# Patient Record
Sex: MALE | Race: WHITE | HISPANIC OR LATINO | Employment: UNEMPLOYED | URBAN - METROPOLITAN AREA
[De-identification: names, ages, dates, MRNs, and addresses within clinical notes are randomized per-mention and may not be internally consistent; named-entity substitution may affect disease eponyms.]

---

## 2022-05-23 ENCOUNTER — HOSPITAL ENCOUNTER (EMERGENCY)
Facility: CLINIC | Age: 27
Discharge: HOME | End: 2022-05-23
Attending: EMERGENCY MEDICINE | Admitting: EMERGENCY MEDICINE

## 2022-05-23 VITALS
HEIGHT: 67 IN | BODY MASS INDEX: 22.49 KG/M2 | DIASTOLIC BLOOD PRESSURE: 51 MMHG | TEMPERATURE: 98.1 F | HEART RATE: 66 BPM | SYSTOLIC BLOOD PRESSURE: 123 MMHG | OXYGEN SATURATION: 98 % | RESPIRATION RATE: 13 BRPM | WEIGHT: 143.3 LBS

## 2022-05-23 DIAGNOSIS — S61.012A LACERATION OF LEFT THUMB WITHOUT FOREIGN BODY WITHOUT DAMAGE TO NAIL, INITIAL ENCOUNTER: ICD-10-CM

## 2022-05-23 PROCEDURE — 99283 EMERGENCY DEPT VISIT LOW MDM: CPT

## 2022-05-23 PROCEDURE — 12001 RPR S/N/AX/GEN/TRNK 2.5CM/<: CPT

## 2022-05-23 ASSESSMENT — ENCOUNTER SYMPTOMS
NUMBNESS: 0
CONSTITUTIONAL NEGATIVE: 1
WOUND: 1

## 2022-05-23 NOTE — ED TRIAGE NOTES
Pt visiting from [place], was taking the trash out and cut his left thumb with a piece of glass that was on the bottom of the trash bag -about 2 hrs ago. Denies any PMHx. And states he had a tetanus shot 1 yr ago as he had another laceratin back then.     Triage Assessment     Row Name 05/23/22 0625       Triage Assessment (Adult)    Airway WDL WDL       Respiratory WDL    Respiratory WDL WDL       Skin Circulation/Temperature WDL    Skin Circulation/Temperature WDL X   about 3 cm lac to the left thumb       Cardiac WDL    Cardiac WDL WDL       Peripheral/Neurovascular WDL    Peripheral Neurovascular WDL WDL       Cognitive/Neuro/Behavioral WDL    Cognitive/Neuro/Behavioral WDL WDL

## 2022-05-23 NOTE — ED PROVIDER NOTES
"  History     Chief Complaint:  Thumb Laceration       HPI   Fan Smith is a 26 year old male who presents with left thumb laceration.  Patient reports that he was taking out the trash this morning, and he used 2 hands to attempt to throw the bag into a larger receptacle outside.  He states he cut his hand on something inside of the bed, he thinks this was a piece of glass or a bottle inside the bag.  He reports pain in his left thumb with throbbing.  Sensation and motor are intact.  He reports he was drinking alcohol this morning, 4-5 beers.    He reports he is visiting from [place], staying with his cousin.    He notes that his tetanus was updated last year when he had another laceration.    ROS:  Review of Systems   Constitutional: Negative.    Skin: Positive for wound.   Neurological: Negative for numbness.       Allergies:  No Known Allergies     Medications:    Denies current daily medications    Past Medical History:    Denies pertinent past medical history    Past Surgical History:    No past surgical history on file.     Family History:    family history is not on file.    Social History:   Smokes 1 pack of cigarettes per day  Reports daily alcohol use.  Reports marijuana use.  PCP: No primary care provider on file.     Physical Exam     Patient Vitals for the past 24 hrs:   BP Temp Temp src Pulse Resp SpO2 Height Weight   05/23/22 0624 123/51 98.1  F (36.7  C) Temporal 66 13 98 % 1.7 m (5' 6.93\") 65 kg (143 lb 4.8 oz)        Physical Exam  Vitals: Reviewed, as above.   General: Alert and oriented, in mild distress. Resting on bed.  Skin: Warm and well-perfused. 2 cm laceration to left thumb volar surface just proximal to IP joint.   HEENT: Head: Normocephalic, atraumatic. Facial features symmetric. Eyes: Conjunctiva pink, sclera white. EOMs grossly intact. Ears: Auricles without lesion, erythema, or edema. Mouth and throat: Lips are moist with no chapping, lesions, or edema, Buccal mucosa is " pink and moist without lesions. Oropharyngeal mucosa is pink and moist with no erythema, edema, or exudate.   Neck: Supple with no lymphadenopathy. Full ROM.   Pulmonary: Chest wall expansion symmetric with no increased work of breathing. Lungs clear to auscultation bilaterally.   Cardiovascular: Heart RRR with no murmurs, rubs, or gallops. 2+ radial pulses bilaterally. No peripheral edema.   Abdominal: No distension.   Musculoskeletal: Moves all extremities spontaneously. Full ROM and 5/5 strength to left thumb and fingers. No tenderness to palpation of MCPs.   Psych: Affect appropriate.  Answers questions appropriately. Patient appears calm.      Emergency Department Course       Laboratory:  Labs Ordered and Resulted from Time of ED Arrival to Time of ED Departure - No data to display     [facility]    -Laceration Repair    Date/Time: 5/23/2022 8:28 AM  Performed by: Darlene Moctezuma PA-C  Authorized by: Darlene Moctezuma PA-C     Risks, benefits and alternatives discussed.      ANESTHESIA (see MAR for exact dosages):     Anesthesia method:  Local infiltration    Local anesthetic:  Lidocaine 1% w/o epi  LACERATION DETAILS     Location:  Finger    Finger location:  L thumb    Length (cm):  2    Depth (mm):  3    REPAIR TYPE:     Repair type:  Simple      EXPLORATION:     Hemostasis achieved with:  Direct pressure    Wound exploration: wound explored through full range of motion and entire depth of wound probed and visualized      TREATMENT:     Area cleansed with:  Saline and Shur-Clens    Visualized foreign bodies/material removed: no      SKIN REPAIR     Repair method:  Sutures    Suture size:  4-0    Suture material:  Nylon    Suture technique:  Simple interrupted    APPROXIMATION     Approximation:  Close    POST-PROCEDURE DETAILS     Dressing:  Antibiotic ointment and bulky dressing        PROCEDURE    Patient Tolerance:  Patient tolerated the procedure well with no immediate  complications       Emergency Department Course:             Reviewed:  I reviewed nursing notes, vitals and past medical history    Assessments/Consults:   ED Course as of 05/23/22 0906   Mon May 23, 2022   0645 I obtained history and examined the patient, as noted above.     0715 I rechecked the patient and performed laceration repair, as noted above.         Interventions:  Lidocaine 1% 5.5 mL, per procedure note    Disposition:  The patient was discharged to home.      Impression & Plan    CMS Diagnoses: None      Medical Decision Making:  Fan is a 26 year old male who presents with left thumb laceration.  Please see HPI and physical exam for full details.  Physical exam is consistent with a simple laceration with no tendon or muscle involvement.  Patient has full strength and range of motion of the left thumb.  Laceration repair was performed according to procedure note.  Patient is very confident that he had a tetanus shot last year following a similar injury.  I instructed the patient on laceration care and follow-up.  This information was provided in both English and Swedish.  I also instructed the patient to use ibuprofen and Tylenol for pain.  Patient reports that he has a clinic to follow-up with, however he was also provided with information for the [place] primary care clinic for follow-up for suture removal in 10 days.  All questions were answered.  Patient was discharged home in stable condition.        Diagnosis:    ICD-10-CM    1. Laceration of left thumb without foreign body without damage to nail, initial encounter  S61.012A                   Darlene Moctezuma PA-C  05/23/22 0907

## 2022-05-23 NOTE — ED PROVIDER NOTES
Emergency Department Attending Supervision Note  5/23/2022  7:01 AM    I evaluated this patient in conjunction with ERICA Lemus.    Briefly, the patient presented with a laceration to the left thumb that occurred just PTA.  This occurred when he was trying to throw out the trash.  He believes its a glass bottle that cut him that was in the trash.  Last td was a year ago.    On my exam:  Physical Exam   General:  Sitting on bed with friend at bedside, comfortable appearing.   HENT:  No obvious trauma to head  Right Ear:  External ear normal.   Left Ear:  External ear normal.   Nose:  Nose normal.   Eyes:  Conjunctivae and EOM are normal.  Neck: Normal range of motion. Neck supple. No tracheal deviation present.   Pulm/Chest: No respiratory distress  M/S: Normal range of motion.   Neuro: Alert. GCS 15.  Skin: Skin is warm and dry. No rash noted. Not diaphoretic. Laceration to left thumb overlying the volar IP joint area.  No tendon laceration appreciated.  Flexion is still intact at IP joint.  Psych: Normal mood and affect. Behavior is normal.     Brief MDM:  Fan Smith is a very pleasant 26 year old year old male who presents to the emergency department with concern of a laceration to the thumb.  It was anesthetized by Darlene Moctezuma, cleaned thoroughly by the tech and repaired by Darlene Moctezuma.  Please see her note for additional details.  Wound care discussed.  Range of motion was intact.  There was no signs of tendon injury.  Sutures out in approximately 10 days since its overlying the IP joint.    My Impression and diagnosis:    ICD-10-CM    1. Laceration of left thumb without foreign body without damage to nail, initial encounter  S61.012A          DO Romaine Rodriguez Robert James, DO  05/23/22 0715